# Patient Record
Sex: FEMALE | Race: WHITE
[De-identification: names, ages, dates, MRNs, and addresses within clinical notes are randomized per-mention and may not be internally consistent; named-entity substitution may affect disease eponyms.]

---

## 2019-10-01 ENCOUNTER — HOSPITAL ENCOUNTER (OUTPATIENT)
Dept: HOSPITAL 61 - ECHO | Age: 64
Discharge: HOME | End: 2019-10-01
Attending: INTERNAL MEDICINE
Payer: MEDICARE

## 2019-10-01 DIAGNOSIS — I08.1: Primary | ICD-10-CM

## 2019-10-01 PROCEDURE — 93306 TTE W/DOPPLER COMPLETE: CPT

## 2019-10-01 NOTE — CARD
MR#: [id number]

Account#: [id number]

Accession#: [id number]

Date of Study: 10/01/2019

Ordering Physician: MARITA OROPEZA, 

Referring Physician: MARITA OROPEZA, 

Tech: Senia Laurent FUENTES





--------------- APPROVED REPORT --------------





EXAM: Two-dimensional and M-mode echocardiogram with Doppler and color Doppler.



Other Information 

Quality : Fair



INDICATION

Exertional Dyspnea



2D DIMENSIONS 

RVDd2.5 (2.9-3.5cm)Left Atrium(2D)3.5 (1.6-4.0cm)

IVSd1.1 (0.7-1.1cm)Aortic Root(2D)2.9 (2.0-3.7cm)

LVDd5.3 (3.9-5.9cm)LVOT Diameter2.0 (1.8-2.4cm)

PWd1.1 (0.7-1.1cm)LVDs3.5 (2.5-4.0cm)

FS (%) 34.1 %SV83.0 ml

LVEF(%)60.0 (>50%)



Aortic Valve

AoV Peak Angel.93.5cm/sAoV VTI19.0cm

AO Peak GR.3.5mmHgLVOT Peak Angel.101.4cm/s

AO Mean GR.2mmHgAVA (VMAX)3.37cm2

LUZ MARINA   (VTI)3.20cm2



Mitral Valve

MV E Jzlyigds69.1cm/sMV DECEL [id number]

MV A Wjtpnfih73.6cm/sE/A  Ratio1.1



Tricuspid Valve

TR P. Fnqqsmhq949cy/sRAP SOXBXNLO9saEp

TR Peak Gr.58omYrHVXU65xrMs



Pulmonary Vein

S1 Uglpgxny66.8cm/sD2 Tvwnezie12.4cm/s



 LEFT VENTRICLE 

The left ventricle is normal size. There is normal left ventricular wall thickness. The left ventricu
lar systolic function is normal. The Ejection Fraction is 55-60%. There is normal LV segmental wall m
otion.



 RIGHT VENTRICLE 

The right ventricle is normal size. The right ventricular systolic function is normal.



 ATRIA 

The left atrium size is normal. The right atrium size is normal. The interatrial septum is intact wit
h no evidence for an atrial septal defect or patent foramen ovale as noted on 2-D or Doppler imaging.




 AORTIC VALVE 

The aortic valve is not well visualized but appears to be functioning normally by Doppler interrogati
on. Doppler and Color Flow revealed no significant aortic regurgitation. There is no significant aort
ic valvular stenosis.



 MITRAL VALVE 

The mitral valve is calcified but opens well. There is no evidence of mitral valve prolapse. There is
 no mitral valve stenosis. Doppler and Color-flow revealed mild mitral regurgitation.



 TRICUSPID VALVE 

The tricuspid valve is normal in structure and function. Doppler and Color Flow revealed mild tricusp
id regurgitation. The PA pressure was estimated at 29 mmHg. There is no tricuspid valve stenosis.



 PULMONIC VALVE 

The pulmonic valve is not well visualized. Doppler and Color Flow revealed no pulmonic valvular regur
gitation. There is no pulmonic valvular stenosis.



 GREAT VESSELS 

The aortic root is normal in size. The ascending aorta is moderately dilated at 3.8 cm. The IVC is no
rmal in size and collapses >50% with inspiration.



 PERICARDIAL EFFUSION 

There is no evidence of significant pericardial effusion.



Critical Notification

Critical Value: No



<Conclusion>

The left ventricular systolic function is normal.

The Ejection Fraction is 55-60%.

There is normal LV segmental wall motion.

Mild mitral regurgitation.

Mild tricuspid regurgitation.

The PA pressure was estimated at 29 mmHg.

There is no evidence of significant pericardial effusion.



Signed by : Elias Motley, 

Electronically Approved : 10/01/2019 12:21:21